# Patient Record
Sex: MALE | Race: WHITE | HISPANIC OR LATINO | Employment: STUDENT | URBAN - METROPOLITAN AREA
[De-identification: names, ages, dates, MRNs, and addresses within clinical notes are randomized per-mention and may not be internally consistent; named-entity substitution may affect disease eponyms.]

---

## 2021-01-11 ENCOUNTER — OFFICE VISIT (OUTPATIENT)
Dept: URGENT CARE | Facility: CLINIC | Age: 21
End: 2021-01-11
Payer: COMMERCIAL

## 2021-01-11 VITALS
WEIGHT: 150 LBS | OXYGEN SATURATION: 100 % | RESPIRATION RATE: 16 BRPM | TEMPERATURE: 97 F | HEART RATE: 45 BPM | DIASTOLIC BLOOD PRESSURE: 54 MMHG | SYSTOLIC BLOOD PRESSURE: 126 MMHG

## 2021-01-11 DIAGNOSIS — S93.401A SPRAIN OF RIGHT ANKLE, UNSPECIFIED LIGAMENT, INITIAL ENCOUNTER: Primary | ICD-10-CM

## 2021-01-11 PROCEDURE — 73610 X-RAY EXAM OF ANKLE: CPT | Mod: RT,S$GLB,, | Performed by: RADIOLOGY

## 2021-01-11 PROCEDURE — 73610 XR ANKLE COMPLETE 3 VIEW RIGHT: ICD-10-PCS | Mod: RT,S$GLB,, | Performed by: RADIOLOGY

## 2021-01-11 PROCEDURE — 99203 OFFICE O/P NEW LOW 30 MIN: CPT | Mod: S$GLB,,, | Performed by: PHYSICIAN ASSISTANT

## 2021-01-11 PROCEDURE — 99203 PR OFFICE/OUTPT VISIT, NEW, LEVL III, 30-44 MIN: ICD-10-PCS | Mod: S$GLB,,, | Performed by: PHYSICIAN ASSISTANT

## 2022-01-15 ENCOUNTER — OFFICE VISIT (OUTPATIENT)
Dept: URGENT CARE | Facility: CLINIC | Age: 22
End: 2022-01-15
Payer: COMMERCIAL

## 2022-01-15 VITALS
OXYGEN SATURATION: 96 % | WEIGHT: 160 LBS | HEART RATE: 62 BPM | DIASTOLIC BLOOD PRESSURE: 80 MMHG | SYSTOLIC BLOOD PRESSURE: 125 MMHG | HEIGHT: 67 IN | TEMPERATURE: 98 F | RESPIRATION RATE: 18 BRPM | BODY MASS INDEX: 25.11 KG/M2

## 2022-01-15 DIAGNOSIS — S89.92XA INJURY OF LEFT KNEE, INITIAL ENCOUNTER: Primary | ICD-10-CM

## 2022-01-15 DIAGNOSIS — S83.92XA SPRAIN OF LEFT KNEE, UNSPECIFIED LIGAMENT, INITIAL ENCOUNTER: ICD-10-CM

## 2022-01-15 PROCEDURE — 3008F PR BODY MASS INDEX (BMI) DOCUMENTED: ICD-10-PCS | Mod: CPTII,S$GLB,, | Performed by: STUDENT IN AN ORGANIZED HEALTH CARE EDUCATION/TRAINING PROGRAM

## 2022-01-15 PROCEDURE — 1160F PR REVIEW ALL MEDS BY PRESCRIBER/CLIN PHARMACIST DOCUMENTED: ICD-10-PCS | Mod: CPTII,S$GLB,, | Performed by: STUDENT IN AN ORGANIZED HEALTH CARE EDUCATION/TRAINING PROGRAM

## 2022-01-15 PROCEDURE — 73564 XR KNEE COMP 4 OR MORE VIEWS LEFT: ICD-10-PCS | Mod: LT,S$GLB,, | Performed by: INTERNAL MEDICINE

## 2022-01-15 PROCEDURE — 99213 PR OFFICE/OUTPT VISIT, EST, LEVL III, 20-29 MIN: ICD-10-PCS | Mod: S$GLB,,, | Performed by: STUDENT IN AN ORGANIZED HEALTH CARE EDUCATION/TRAINING PROGRAM

## 2022-01-15 PROCEDURE — 1160F RVW MEDS BY RX/DR IN RCRD: CPT | Mod: CPTII,S$GLB,, | Performed by: STUDENT IN AN ORGANIZED HEALTH CARE EDUCATION/TRAINING PROGRAM

## 2022-01-15 PROCEDURE — 3074F SYST BP LT 130 MM HG: CPT | Mod: CPTII,S$GLB,, | Performed by: STUDENT IN AN ORGANIZED HEALTH CARE EDUCATION/TRAINING PROGRAM

## 2022-01-15 PROCEDURE — 3074F PR MOST RECENT SYSTOLIC BLOOD PRESSURE < 130 MM HG: ICD-10-PCS | Mod: CPTII,S$GLB,, | Performed by: STUDENT IN AN ORGANIZED HEALTH CARE EDUCATION/TRAINING PROGRAM

## 2022-01-15 PROCEDURE — 99213 OFFICE O/P EST LOW 20 MIN: CPT | Mod: S$GLB,,, | Performed by: STUDENT IN AN ORGANIZED HEALTH CARE EDUCATION/TRAINING PROGRAM

## 2022-01-15 PROCEDURE — 1159F MED LIST DOCD IN RCRD: CPT | Mod: CPTII,S$GLB,, | Performed by: STUDENT IN AN ORGANIZED HEALTH CARE EDUCATION/TRAINING PROGRAM

## 2022-01-15 PROCEDURE — 3079F DIAST BP 80-89 MM HG: CPT | Mod: CPTII,S$GLB,, | Performed by: STUDENT IN AN ORGANIZED HEALTH CARE EDUCATION/TRAINING PROGRAM

## 2022-01-15 PROCEDURE — 73564 X-RAY EXAM KNEE 4 OR MORE: CPT | Mod: LT,S$GLB,, | Performed by: INTERNAL MEDICINE

## 2022-01-15 PROCEDURE — 1159F PR MEDICATION LIST DOCUMENTED IN MEDICAL RECORD: ICD-10-PCS | Mod: CPTII,S$GLB,, | Performed by: STUDENT IN AN ORGANIZED HEALTH CARE EDUCATION/TRAINING PROGRAM

## 2022-01-15 PROCEDURE — 3008F BODY MASS INDEX DOCD: CPT | Mod: CPTII,S$GLB,, | Performed by: STUDENT IN AN ORGANIZED HEALTH CARE EDUCATION/TRAINING PROGRAM

## 2022-01-15 PROCEDURE — 3079F PR MOST RECENT DIASTOLIC BLOOD PRESSURE 80-89 MM HG: ICD-10-PCS | Mod: CPTII,S$GLB,, | Performed by: STUDENT IN AN ORGANIZED HEALTH CARE EDUCATION/TRAINING PROGRAM

## 2022-01-15 RX ORDER — NAPROXEN 500 MG/1
500 TABLET ORAL 2 TIMES DAILY WITH MEALS
Qty: 28 TABLET | Refills: 0 | Status: SHIPPED | OUTPATIENT
Start: 2022-01-15 | End: 2022-01-29

## 2022-01-15 NOTE — PATIENT INSTRUCTIONS
Patient Education       Knee Sprain Discharge Instructions   About this topic   A knee sprain means you bent or twisted your knee too far. Inside your knee, you have tough bands of tissue, called ligaments, that hold the bones together. When you bent or twisted your knee too far, one or more of these ligaments stretched or tore. Now your knee is swollen and sore. You may have pain when you try to walk, put weight on your leg, or bend your knee.           What care is needed at home?   · Ask your doctor what you need to do when you go home. Make sure you ask questions if you do not understand what the doctor says.  · Rest your knee. You can use crutches, if needed, to help keep the weight off of your leg.  · Place an ice pack or a bag of frozen peas wrapped in a towel over the painful part. Never put ice right on the skin. Use ice every 1 to 2 hours for 10 to 15 minutes at a time. Use for the first 24 to 48 hours after your injury.  · Wrap your knee with an elastic bandage to give your knee some support and help with swelling.  · Prop your knee on pillows, keeping it raised above the level of your heart. This may help lessen pain and swelling.  · You may want to take drugs like ibuprofen or naproxen for swelling and pain. These are nonsteroidal anti-inflammatory drugs (NSAIDs).  · When your swelling and pain are better, slowly start to stretch your knee. Also start doing gentle activities again.  What follow-up care is needed?   · Your doctor may ask you to make visits to the office to check on your progress. Be sure to keep these visits.  · Your doctor may send you to physical therapy (PT). The PT will teach you exercises to help you get back your strength and motion.  · If you are told to wear a brace, your doctor will advise when to remove it.  What drugs may be needed?   The doctor may order drugs to:  · Help with pain and swelling  Will physical activity be limited?   You may need to wear a brace to control  movement of your knee. You may need to rest your knee for a while. You should not do physical activity that makes your health problem worse. If you run, work out, or play sports, you may not be able to do those things until your health problem gets better.  What problems could happen?   · Joint deformity  · Less leg motion  What can be done to prevent this health problem?   · Stay active and work out to keep your muscles strong and flexible.  · Keep a healthy weight. Being too heavy puts more stress on the knee joint. This makes the knee more at risk for injury.  · Stand with your weight equal on both legs.  · Bend your knees when you lift.  · Wear the right equipment when playing sports. This includes protective equipment and padding.  · Warm up slowly and stretch before you work out. Use good ways to train, such as slowly adding to how far you run. Do not work out if you are overly tired. Take extra care if working out in cold weather.  · Stay away from activities that could result in twisting, sudden stops and starts, and blows to the knee. Sports such as basketball, skiing, football, and jogging are some common sports that can lead to knee injuries.  When do I need to call the doctor?   · The pain or swelling gets worse.  · Your foot or toes are numb or blue or grey in color.  · Your knee is not stable or feels unsteady, like it might give out when you try to walk or put weight on it.  Teach Back: Helping You Understand   The Teach Back Method helps you understand the information we are giving you. After you talk with the staff, tell them in your own words what you learned. This helps to make sure the staff has described each thing clearly. It also helps to explain things that may have been confusing. Before going home, make sure you can do these:  · I can tell you about my condition.  · I can tell you what may help ease my pain.  · I can tell you what I will do if I have more pain or swelling.  Where can I  learn more?   [facility] Choices  https://www.nhs.uk/conditions/sprains-and-strains/#symptoms   Last Reviewed Date   2021-06-18  Consumer Information Use and Disclaimer   This information is not specific medical advice and does not replace information you receive from your health care provider. This is only a brief summary of general information. It does NOT include all information about conditions, illnesses, injuries, tests, procedures, treatments, therapies, discharge instructions or life-style choices that may apply to you. You must talk with your health care provider for complete information about your health and treatment options. This information should not be used to decide whether or not to accept your health care providers advice, instructions or recommendations. Only your health care provider has the knowledge and training to provide advice that is right for you.  Copyright   Copyright © 2021 [employer] and its affiliates and/or licensors. All rights reserved.

## 2022-01-15 NOTE — PROGRESS NOTES
"Subjective:       Patient ID: Antonio Sheridan is a 21 y.o. male.    Vitals:  height is 5' 7" (1.702 m) and weight is 72.6 kg (160 lb). His temperature is 97.5 °F (36.4 °C). His blood pressure is 125/80 and his pulse is 62. His respiration is 18 and oxygen saturation is 96%.     Chief Complaint: Knee Pain    Pt presents for R knee injury x2wks. Patient is athlete and plays college soccer. Reports while playing soccer he was running and stepped wrong, possibly twisting the knee and fell to ground. Had some mild pain and swelling shortly after and stopped playing, pain and swelling worsened the next day. Since has had persistent swelling with improved pain. States pain occurs at random and intermittently feels a locking sensation/instability that is brief. Has tried rest and ibuprofen with mild relief. Hx of prior L knee ACL injury but did not require surgery. Denied f/c, erythema, bruising, wound, numbness, or weakness.    Knee Pain   The incident occurred more than 1 week ago. [place] The injury mechanism was a fall. The pain is present in the left knee. The quality of the pain is described as aching. The pain is at a severity of 4/10. The pain is mild. The pain has been fluctuating since onset. Pertinent negatives include no inability to bear weight, loss of motion, loss of sensation, muscle weakness, numbness or tingling. He reports no foreign bodies present. The symptoms are aggravated by movement, palpation and weight bearing. He has tried NSAIDs and rest for the symptoms. The treatment provided mild relief.       Constitution: Negative for chills and fever.   Musculoskeletal: Positive for joint pain, joint swelling and pain with walking. Negative for abnormal ROM of joint, arthritis and muscle ache.   Skin: Negative for rash, wound, erythema and bruising.   Neurological: Negative for numbness and tingling.   Psychiatric/Behavioral: Negative for confusion.       Objective:      Physical " Exam   Constitutional: He is oriented to person, place, and time. He appears well-developed. He does not appear ill. No distress.   HENT:   Head: Normocephalic and atraumatic.   Ears:   Right Ear: External ear normal.   Left Ear: External ear normal.   Eyes: Conjunctivae and EOM are normal.   Neck: Neck supple.   Cardiovascular: Normal rate, regular rhythm and normal pulses.   Musculoskeletal: Normal range of motion.         General: Swelling and tenderness present. No deformity. Normal range of motion.      Comments: L knee with moderate joint effusion along anterior medial knee; minimal TTP along superior anterior knee otherwise NTTP; negative ant/post drawer, varus/valgus stress, patellar grind, and Cherelle; non-antalgic gait   Neurological: He is alert and oriented to person, place, and time. He displays no weakness. No cranial nerve deficit (CN II-XII grossly intact) or sensory deficit. He exhibits normal muscle tone.   Skin: Skin is warm, dry and no rash. No erythema   Psychiatric: His behavior is normal. Judgment and thought content normal.   Nursing note and vitals reviewed.    X-Ray Knee Complete 4 or More Views Left  Narrative: EXAMINATION:  XR KNEE COMP 4 OR MORE VIEWS LEFT    CLINICAL HISTORY:  Left knee injury 2 wks ago with medial joint effusion;  Unspecified injury of left lower leg, initial encounter    TECHNIQUE:  Three radiographs obtained    COMPARISON:  None    FINDINGS:  There is suprapatellar joint effusion.  On the AP view, there is questionable cortical irregularity along the left tibial plateau laterally.  This is favored to simply relate to patient positioning.  If clinical history and exam are more suspicious for osseous injury in this region, further evaluation with CT can be performed.  There is some mild osteophytic spurring about the left knee.  Impression: As above    Electronically signed by: Becky Galvez MD  Date:    01/15/2022  Time:    10:22        Assessment:       1. Injury  of left knee, initial encounter    2. Sprain of left knee, unspecified ligament, initial encounter          Plan:         Injury of left knee, initial encounter  -     Cancel: XR KNEE 3 VIEW RIGHT; Future; Expected date: 01/15/2022  -     Cancel: X-Ray Knee Complete 4 Or More Views Right; Future; Expected date: 01/15/2022  -     naproxen (NAPROSYN) 500 MG tablet; Take 1 tablet (500 mg total) by mouth 2 (two) times daily with meals. for 14 days  Dispense: 28 tablet; Refill: 0  -     Ambulatory referral/consult to Sports Medicine  -     KNEE BRACE FOR HOME USE  -     X-Ray Knee Complete 4 or More Views Left; Future; Expected date: 01/15/2022 - study independently reviewed and interpreted by  physician and discussed results with patient    Sprain of left knee, unspecified ligament, initial encounter  -     naproxen (NAPROSYN) 500 MG tablet; Take 1 tablet (500 mg total) by mouth 2 (two) times daily with meals. for 14 days  Dispense: 28 tablet; Refill: 0  -     Ambulatory referral/consult to Sports Medicine  -     KNEE BRACE FOR HOME USE  -     X-Ray Knee Complete 4 or More Views Left; Future; Expected date: 01/15/2022  - counseled on home care and OTC medications    Results, medications and diagnosis reviewed with patient, questions answered, and return precautions given    Follow up in about 1 week (around 1/22/2022) for re-evaluation with Sports Medicine.    Lito Lazaro MD/MPH  [facility]  [facility]

## 2022-08-05 ENCOUNTER — OCCUPATIONAL HEALTH (OUTPATIENT)
Dept: URGENT CARE | Facility: CLINIC | Age: 22
End: 2022-08-05

## 2022-08-05 DIAGNOSIS — Z00.00 ENCOUNTER FOR PHYSICAL EXAMINATION: Primary | ICD-10-CM

## 2022-08-05 PROCEDURE — 99499 UNLISTED E&M SERVICE: CPT | Mod: S$GLB,,, | Performed by: NURSE PRACTITIONER

## 2022-08-05 PROCEDURE — 99499 PHYSICAL, BASIC COMPLEXITY: ICD-10-PCS | Mod: S$GLB,,, | Performed by: NURSE PRACTITIONER

## 2022-08-16 ENCOUNTER — LAB VISIT (OUTPATIENT)
Dept: LAB | Facility: HOSPITAL | Age: 22
End: 2022-08-16
Payer: COMMERCIAL

## 2022-08-16 DIAGNOSIS — Z13.0 SCREENING FOR SICKLE-CELL DISEASE OR TRAIT: Primary | ICD-10-CM

## 2022-08-16 DIAGNOSIS — Z11.59 SCREENING EXAMINATION FOR POLIOMYELITIS: ICD-10-CM

## 2022-08-16 LAB — HGB S BLD QL SOLY: NEGATIVE

## 2022-08-16 PROCEDURE — 85660 RBC SICKLE CELL TEST: CPT

## 2022-08-16 PROCEDURE — 36415 COLL VENOUS BLD VENIPUNCTURE: CPT | Mod: PN
